# Patient Record
Sex: MALE | Race: BLACK OR AFRICAN AMERICAN | Employment: UNEMPLOYED | URBAN - METROPOLITAN AREA
[De-identification: names, ages, dates, MRNs, and addresses within clinical notes are randomized per-mention and may not be internally consistent; named-entity substitution may affect disease eponyms.]

---

## 2017-06-09 ENCOUNTER — TELEPHONE (OUTPATIENT)
Dept: ENDOCRINOLOGY | Age: 22
End: 2017-06-09

## 2017-06-09 NOTE — TELEPHONE ENCOUNTER
----- Message from [employer] sent at 6/9/2017  9:47 AM EDT -----  Regarding: /Telephone  Pt called requesting a call back to discuss blood pressure. Pt stated blood pressure has been elevated for the last three days. Pt state the highest has been 157/100 . Pt best contact number is [phone].